# Patient Record
Sex: FEMALE | Race: OTHER | Employment: STUDENT | URBAN - METROPOLITAN AREA
[De-identification: names, ages, dates, MRNs, and addresses within clinical notes are randomized per-mention and may not be internally consistent; named-entity substitution may affect disease eponyms.]

---
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
## 2020-11-06 ENCOUNTER — LAB REQUISITION (OUTPATIENT)
Age: 20
End: 2020-11-06
Payer: COMMERCIAL

## 2020-11-06 DIAGNOSIS — Z20.828 CONTACT WITH AND (SUSPECTED) EXPOSURE TO OTHER VIRAL COMMUNICABLE DISEASES: ICD-10-CM

## 2020-11-09 NOTE — PROGRESS NOTES
Results reviewed and noted to be negative. Appropriate [facility] personnel responsible for documenting and following-up with client notified of test results and need to communicate such results to the client.

## 2020-12-11 ENCOUNTER — HOSPITAL ENCOUNTER (OUTPATIENT)
Age: 20
Discharge: HOME | End: 2020-12-11
Attending: EMERGENCY MEDICINE
Payer: COMMERCIAL

## 2020-12-11 VITALS
SYSTOLIC BLOOD PRESSURE: 130 MMHG | HEIGHT: 61 IN | RESPIRATION RATE: 18 BRPM | DIASTOLIC BLOOD PRESSURE: 79 MMHG | TEMPERATURE: 99 F | WEIGHT: 100 LBS | BODY MASS INDEX: 18.88 KG/M2 | HEART RATE: 110 BPM

## 2020-12-11 DIAGNOSIS — B37.3 CANDIDA VAGINITIS: Primary | ICD-10-CM

## 2020-12-11 PROCEDURE — 99203 OFFICE O/P NEW LOW 30 MIN: CPT | Performed by: EMERGENCY MEDICINE

## 2020-12-11 RX ORDER — FLUCONAZOLE 150 MG/1
150 TABLET ORAL ONCE
Qty: 1 TABLET | Refills: 0 | Status: SHIPPED | OUTPATIENT
Start: 2020-12-11 | End: 2020-12-11

## 2020-12-11 RX ORDER — NORGESTREL-ETHINYL ESTRADIOL 0.3-0.03MG
1 TABLET ORAL DAILY
COMMUNITY
Start: 2020-11-11 | End: 2020-12-31

## 2020-12-11 NOTE — ED PROVIDER NOTES
Patient Seen in: [facility]      History   Patient presents with:  Rash Skin Problem    Stated Complaint: rash    HPI  Patient has had some itching and vaginal irritation that feels somewhat similar to yeast infection she has had in the past. Head: Normocephalic and atraumatic. Eyes:      Conjunctiva/sclera: Conjunctivae normal.   Cardiovascular:      Rate and Rhythm: Normal rate and regular rhythm. Heart sounds: Normal heart sounds.    Pulmonary:      Effort: Pulmonary effort is corina

## 2020-12-11 NOTE — ED INITIAL ASSESSMENT (HPI)
Pt complaining of vaginal rash after using new sanitary pads this month and last month. No itchiness. No discharge.

## 2020-12-31 ENCOUNTER — OFFICE VISIT (OUTPATIENT)
Dept: OBGYN CLINIC | Facility: CLINIC | Age: 20
End: 2020-12-31
Payer: COMMERCIAL

## 2020-12-31 VITALS
BODY MASS INDEX: 21 KG/M2 | WEIGHT: 110.81 LBS | HEART RATE: 96 BPM | SYSTOLIC BLOOD PRESSURE: 111 MMHG | DIASTOLIC BLOOD PRESSURE: 78 MMHG

## 2020-12-31 DIAGNOSIS — Z01.419 ENCOUNTER FOR GYNECOLOGICAL EXAMINATION: Primary | ICD-10-CM

## 2020-12-31 DIAGNOSIS — Z11.3 SCREEN FOR STD (SEXUALLY TRANSMITTED DISEASE): ICD-10-CM

## 2020-12-31 PROCEDURE — 3074F SYST BP LT 130 MM HG: CPT | Performed by: OBSTETRICS & GYNECOLOGY

## 2020-12-31 PROCEDURE — 99385 PREV VISIT NEW AGE 18-39: CPT | Performed by: OBSTETRICS & GYNECOLOGY

## 2020-12-31 PROCEDURE — 3078F DIAST BP <80 MM HG: CPT | Performed by: OBSTETRICS & GYNECOLOGY

## 2020-12-31 RX ORDER — NORGESTREL-ETHINYL ESTRADIOL 0.3-0.03MG
1 TABLET ORAL DAILY
Qty: 1 PACKAGE | Refills: 12 | Status: SHIPPED | OUTPATIENT
Start: 2020-12-31 | End: 2021-12-03

## 2020-12-31 NOTE — PROGRESS NOTES
Niraj Lewis is a 21year old female  Patient's last menstrual period was 2020. here for annual exam.       New pt. Doing well with generic Lo/ovral.    Wants STD screen.       OBSTETRICS HISTORY:  OB History     T0    L0    SAB0 Body mass index is 20.94 kg/m².     Constitutional: well developed, well nourished  Neck/Thyroid: thyroid symmetric, no nodules  Heart:  Regular rate and rhythm  Lungs:  Clear to asculation  Breast: normal without palpable masses, tenderness, asymmetry, ni

## 2021-01-05 LAB
C TRACH DNA SPEC QL NAA+PROBE: NEGATIVE
N GONORRHOEA DNA SPEC QL NAA+PROBE: NEGATIVE
T VAGINALIS RRNA SPEC QL NAA+PROBE: NEGATIVE

## 2021-10-10 ENCOUNTER — HOSPITAL ENCOUNTER (OUTPATIENT)
Age: 21
Discharge: HOME | End: 2021-10-10
Payer: COMMERCIAL

## 2021-10-10 VITALS
BODY MASS INDEX: 21.71 KG/M2 | TEMPERATURE: 98 F | SYSTOLIC BLOOD PRESSURE: 115 MMHG | HEIGHT: 61 IN | OXYGEN SATURATION: 100 % | DIASTOLIC BLOOD PRESSURE: 72 MMHG | RESPIRATION RATE: 20 BRPM | HEART RATE: 81 BPM | WEIGHT: 115 LBS

## 2021-10-10 DIAGNOSIS — R20.2 PARESTHESIAS: Primary | ICD-10-CM

## 2021-10-10 PROCEDURE — 99203 OFFICE O/P NEW LOW 30 MIN: CPT | Performed by: NURSE PRACTITIONER

## 2021-10-10 RX ORDER — CYCLOBENZAPRINE HCL 10 MG
10 TABLET ORAL 3 TIMES DAILY PRN
Qty: 20 TABLET | Refills: 0 | Status: SHIPPED | OUTPATIENT
Start: 2021-10-10 | End: 2021-10-17

## 2021-10-10 NOTE — ED PROVIDER NOTES
Patient Seen in: [facility]      History   Patient presents with:  Musculoskeletal Problem    Stated Complaint: both legs tinglling X 1 wk    Subjective:   Eliana Jimenes is a 70-year-old female presenting to the immediate care complaining of Pulse 81   Temp 97.8 °F (36.6 °C) (Temporal)   Resp 20   Ht 154.9 cm (5' 1\")   Wt 52.2 kg   LMP 10/06/2021   SpO2 100%   BMI 21.73 kg/m²         Physical Exam  Vitals and nursing note reviewed. Constitutional:       Appearance: Normal appearance.    HENT Transient paresthesia. Low suspicion for DVT. Sciatica is not fully excluded. Discharge home. RX Flexeril, Epsom salt baths. Follow-up with primary care for further management.      Patient is afebrile, nontoxic in appearance without signs of clinical deter

## 2021-12-03 ENCOUNTER — TELEPHONE (OUTPATIENT)
Dept: OBGYN CLINIC | Facility: CLINIC | Age: 21
End: 2021-12-03

## 2021-12-03 RX ORDER — NORGESTREL-ETHINYL ESTRADIOL 0.3-0.03MG
1 TABLET ORAL DAILY
Qty: 28 TABLET | Refills: 3 | Status: SHIPPED | OUTPATIENT
Start: 2021-12-03

## 2021-12-03 NOTE — TELEPHONE ENCOUNTER
Pt requesting refill of b/c. Due for annual,   Scheduled annual on 3/30/22. Please advise. norgestrel-ethinyl estradiol (CRYSELLE-28) 0.3-30 MG-MCG Oral Tab 1 Package 12 12/31/2020    Sig:   Take 1 tablet by mouth daily.      Route:   Oral     Order

## 2022-03-22 RX ORDER — NORGESTREL-ETHINYL ESTRADIOL 0.3-0.03MG
1 TABLET ORAL DAILY
Qty: 28 TABLET | Refills: 3 | OUTPATIENT
Start: 2022-03-22

## 2022-03-22 NOTE — TELEPHONE ENCOUNTER
Pt given OCP refill per protocol on 12/2021 for 28 tablets and 3 refills to cover until annual on 3/30/2022 with JOSEK.

## 2022-03-30 ENCOUNTER — OFFICE VISIT (OUTPATIENT)
Dept: OBGYN CLINIC | Facility: CLINIC | Age: 22
End: 2022-03-30
Payer: COMMERCIAL

## 2022-03-30 VITALS
HEART RATE: 105 BPM | DIASTOLIC BLOOD PRESSURE: 79 MMHG | BODY MASS INDEX: 23 KG/M2 | SYSTOLIC BLOOD PRESSURE: 112 MMHG | WEIGHT: 121.19 LBS

## 2022-03-30 DIAGNOSIS — Z12.4 SCREENING FOR MALIGNANT NEOPLASM OF CERVIX: ICD-10-CM

## 2022-03-30 DIAGNOSIS — Z01.419 ENCOUNTER FOR GYNECOLOGICAL EXAMINATION: Primary | ICD-10-CM

## 2022-03-30 DIAGNOSIS — Z11.3 SCREEN FOR STD (SEXUALLY TRANSMITTED DISEASE): ICD-10-CM

## 2022-03-30 PROCEDURE — 3078F DIAST BP <80 MM HG: CPT | Performed by: OBSTETRICS & GYNECOLOGY

## 2022-03-30 PROCEDURE — 99395 PREV VISIT EST AGE 18-39: CPT | Performed by: OBSTETRICS & GYNECOLOGY

## 2022-03-30 PROCEDURE — 3074F SYST BP LT 130 MM HG: CPT | Performed by: OBSTETRICS & GYNECOLOGY

## 2022-03-30 RX ORDER — NORGESTREL-ETHINYL ESTRADIOL 0.3-0.03MG
1 TABLET ORAL DAILY
Qty: 28 TABLET | Refills: 12 | Status: SHIPPED | OUTPATIENT
Start: 2022-03-30

## 2022-03-31 LAB
C TRACH DNA SPEC QL NAA+PROBE: NEGATIVE
N GONORRHOEA DNA SPEC QL NAA+PROBE: NEGATIVE

## 2022-04-01 LAB — T VAGINALIS RRNA SPEC QL NAA+PROBE: NEGATIVE

## 2022-07-08 RX ORDER — NORGESTREL-ETHINYL ESTRADIOL 0.3-0.03MG
1 TABLET ORAL DAILY
Qty: 28 TABLET | Refills: 12 | OUTPATIENT
Start: 2022-07-08

## 2022-12-20 ENCOUNTER — HOSPITAL ENCOUNTER (EMERGENCY)
Facility: HOSPITAL | Age: 22
Discharge: HOME | End: 2022-12-20
Attending: EMERGENCY MEDICINE
Payer: COMMERCIAL

## 2022-12-20 VITALS
RESPIRATION RATE: 17 BRPM | TEMPERATURE: 98 F | OXYGEN SATURATION: 100 % | HEART RATE: 102 BPM | DIASTOLIC BLOOD PRESSURE: 61 MMHG | SYSTOLIC BLOOD PRESSURE: 100 MMHG

## 2022-12-20 DIAGNOSIS — R11.2 NAUSEA AND VOMITING IN ADULT: Primary | ICD-10-CM

## 2022-12-20 LAB
ANION GAP SERPL CALC-SCNC: 6 MMOL/L (ref 0–18)
BASOPHILS # BLD AUTO: 0.03 X10(3) UL (ref 0–0.2)
BASOPHILS NFR BLD AUTO: 0.2 %
BUN BLD-MCNC: 13 MG/DL (ref 7–18)
BUN/CREAT SERPL: 14.6 (ref 10–20)
CALCIUM BLD-MCNC: 9.1 MG/DL (ref 8.5–10.1)
CHLORIDE SERPL-SCNC: 107 MMOL/L (ref 98–112)
CO2 SERPL-SCNC: 26 MMOL/L (ref 21–32)
CREAT BLD-MCNC: 0.89 MG/DL
DEPRECATED RDW RBC AUTO: 38.5 FL (ref 35.1–46.3)
EOSINOPHIL # BLD AUTO: 0 X10(3) UL (ref 0–0.7)
EOSINOPHIL NFR BLD AUTO: 0 %
ERYTHROCYTE [DISTWIDTH] IN BLOOD BY AUTOMATED COUNT: 11.4 % (ref 11–15)
GFR SERPLBLD BASED ON 1.73 SQ M-ARVRAT: 94 ML/MIN/1.73M2 (ref 60–?)
GLUCOSE BLD-MCNC: 127 MG/DL (ref 70–99)
HCT VFR BLD AUTO: 42 %
HGB BLD-MCNC: 14.5 G/DL
IMM GRANULOCYTES # BLD AUTO: 0.06 X10(3) UL (ref 0–1)
IMM GRANULOCYTES NFR BLD: 0.4 %
LYMPHOCYTES # BLD AUTO: 1.08 X10(3) UL (ref 1–4)
LYMPHOCYTES NFR BLD AUTO: 6.6 %
MCH RBC QN AUTO: 31.7 PG (ref 26–34)
MCHC RBC AUTO-ENTMCNC: 34.5 G/DL (ref 31–37)
MCV RBC AUTO: 91.9 FL
MONOCYTES # BLD AUTO: 0.83 X10(3) UL (ref 0.1–1)
MONOCYTES NFR BLD AUTO: 5.1 %
NEUTROPHILS # BLD AUTO: 14.41 X10 (3) UL (ref 1.5–7.7)
NEUTROPHILS # BLD AUTO: 14.41 X10(3) UL (ref 1.5–7.7)
NEUTROPHILS NFR BLD AUTO: 87.7 %
OSMOLALITY SERPL CALC.SUM OF ELEC: 290 MOSM/KG (ref 275–295)
PLATELET # BLD AUTO: 389 10(3)UL (ref 150–450)
POTASSIUM SERPL-SCNC: 3.9 MMOL/L (ref 3.5–5.1)
RBC # BLD AUTO: 4.57 X10(6)UL
SODIUM SERPL-SCNC: 139 MMOL/L (ref 136–145)
WBC # BLD AUTO: 16.4 X10(3) UL (ref 4–11)

## 2022-12-20 PROCEDURE — 80048 BASIC METABOLIC PNL TOTAL CA: CPT | Performed by: EMERGENCY MEDICINE

## 2022-12-20 PROCEDURE — 99284 EMERGENCY DEPT VISIT MOD MDM: CPT

## 2022-12-20 PROCEDURE — 96361 HYDRATE IV INFUSION ADD-ON: CPT

## 2022-12-20 PROCEDURE — 96374 THER/PROPH/DIAG INJ IV PUSH: CPT

## 2022-12-20 PROCEDURE — 85025 COMPLETE CBC W/AUTO DIFF WBC: CPT | Performed by: EMERGENCY MEDICINE

## 2022-12-20 RX ORDER — ONDANSETRON 2 MG/ML
4 INJECTION INTRAMUSCULAR; INTRAVENOUS ONCE
Status: COMPLETED | OUTPATIENT
Start: 2022-12-20 | End: 2022-12-20

## 2022-12-20 NOTE — ED INITIAL ASSESSMENT (HPI)
Pt to ED for abdominal pain, nausea and vomiting for the past day. Pt states she took her birth control pill late without eating and symptoms started shortly after.

## 2023-01-15 ENCOUNTER — PATIENT MESSAGE (OUTPATIENT)
Dept: OBGYN CLINIC | Facility: CLINIC | Age: 23
End: 2023-01-15

## 2023-01-16 RX ORDER — NORGESTREL-ETHINYL ESTRADIOL 0.3-0.03MG
1 TABLET ORAL DAILY
Qty: 28 TABLET | Refills: 4 | Status: SHIPPED | OUTPATIENT
Start: 2023-01-16

## 2023-01-16 NOTE — TELEPHONE ENCOUNTER
From: Gabriella Jones  To: Alicia Osborne MD  Sent: 1/15/2023 9:18 PM CST  Subject: Birth control refill     Hello Dr Sawyer Espinoza,    I have scheduled my annual physical in the [facility]. Unfortunately I only have 2 more refills for my birth control. Would you be able to prescribe more to get me till June 28? Thank you!     Ruth López

## 2023-01-16 NOTE — TELEPHONE ENCOUNTER
Last annual - 3/30/2022  Last pap -3/30/2022, normal    Next annual - 6/28/2023    Pt states she has 2 refills and will run out before her annual exam.  If refills are sent now it will discontinue the 2 remaining refills pt has left.   Refill sent for 5 months to replace the 2 months she has and an additional 3 months to cover until annual.

## 2023-04-09 ENCOUNTER — HOSPITAL ENCOUNTER (OUTPATIENT)
Age: 23
Discharge: HOME | End: 2023-04-09
Payer: COMMERCIAL

## 2023-04-09 VITALS
OXYGEN SATURATION: 100 % | SYSTOLIC BLOOD PRESSURE: 132 MMHG | DIASTOLIC BLOOD PRESSURE: 84 MMHG | RESPIRATION RATE: 16 BRPM | HEART RATE: 107 BPM | TEMPERATURE: 98 F

## 2023-04-09 DIAGNOSIS — A08.4 VIRAL GASTROENTERITIS: Primary | ICD-10-CM

## 2023-04-09 PROCEDURE — 99213 OFFICE O/P EST LOW 20 MIN: CPT | Performed by: NURSE PRACTITIONER

## 2023-04-09 RX ORDER — ONDANSETRON 4 MG/1
4 TABLET, ORALLY DISINTEGRATING ORAL EVERY 4 HOURS PRN
Qty: 10 TABLET | Refills: 0 | Status: SHIPPED | OUTPATIENT
Start: 2023-04-09 | End: 2023-04-16

## 2023-04-09 NOTE — ED INITIAL ASSESSMENT (HPI)
Pt reports n/v/d beginning in the middle of the night. States 4x emesis, last 30 min PTA. No fevers, unable to tolerate PO.

## 2023-04-11 RX ORDER — NORGESTREL-ETHINYL ESTRADIOL 0.3-0.03MG
1 TABLET ORAL DAILY
Qty: 84 TABLET | Refills: 0 | Status: SHIPPED | OUTPATIENT
Start: 2023-04-11

## 2023-04-28 ENCOUNTER — OFFICE VISIT (OUTPATIENT)
Dept: OBGYN CLINIC | Facility: CLINIC | Age: 23
End: 2023-04-28

## 2023-04-28 VITALS
WEIGHT: 132 LBS | HEIGHT: 61 IN | SYSTOLIC BLOOD PRESSURE: 116 MMHG | HEART RATE: 92 BPM | DIASTOLIC BLOOD PRESSURE: 78 MMHG | BODY MASS INDEX: 24.92 KG/M2

## 2023-04-28 DIAGNOSIS — Z01.419 ENCOUNTER FOR GYNECOLOGICAL EXAMINATION: Primary | ICD-10-CM

## 2023-04-28 PROCEDURE — 3078F DIAST BP <80 MM HG: CPT | Performed by: OBSTETRICS & GYNECOLOGY

## 2023-04-28 PROCEDURE — 3074F SYST BP LT 130 MM HG: CPT | Performed by: OBSTETRICS & GYNECOLOGY

## 2023-04-28 PROCEDURE — 3008F BODY MASS INDEX DOCD: CPT | Performed by: OBSTETRICS & GYNECOLOGY

## 2023-04-28 PROCEDURE — 99395 PREV VISIT EST AGE 18-39: CPT | Performed by: OBSTETRICS & GYNECOLOGY

## 2023-04-28 RX ORDER — NORGESTREL-ETHINYL ESTRADIOL 0.3-0.03MG
1 TABLET ORAL DAILY
Qty: 84 TABLET | Refills: 3 | Status: SHIPPED | OUTPATIENT
Start: 2023-04-28

## 2023-08-03 ENCOUNTER — PATIENT MESSAGE (OUTPATIENT)
Dept: OBGYN CLINIC | Facility: CLINIC | Age: 23
End: 2023-08-03

## 2023-08-04 NOTE — TELEPHONE ENCOUNTER
From: Rona Fernández  To: Mary Finney MD  Sent: 8/3/2023 11:16 PM CDT  Subject: Bump on private area    [address] ,  I have clear bump on the outside of my vagina and I am not sure what it is. I am not sure if it a pimple or cyst but your opinion would give me piece of mind. It does not hurt or itch, but I would like get in so you can take a look at it. If you can let me know the earliest you can get me in. Thank you!

## 2024-02-13 ENCOUNTER — PATIENT MESSAGE (OUTPATIENT)
Dept: OBGYN CLINIC | Facility: CLINIC | Age: 24
End: 2024-02-13

## 2024-02-14 NOTE — TELEPHONE ENCOUNTER
From: Radha Calle  To: Lena Bradshaw  Sent: 2/13/2024 11:49 PM CST  Subject: Positive pregnancy test    Hello,    I received a positive pregnancy test and would like to schedule an appointment to confirm. This is my first pregnancy so not sure what the process is or how to start.    Thanks!  Radha

## 2024-02-28 ENCOUNTER — OFFICE VISIT (OUTPATIENT)
Dept: OBGYN CLINIC | Facility: CLINIC | Age: 24
End: 2024-02-28
Payer: COMMERCIAL

## 2024-02-28 ENCOUNTER — LAB ENCOUNTER (OUTPATIENT)
Dept: LAB | Facility: HOSPITAL | Age: 24
End: 2024-02-28
Attending: NURSE PRACTITIONER
Payer: COMMERCIAL

## 2024-02-28 VITALS
DIASTOLIC BLOOD PRESSURE: 82 MMHG | WEIGHT: 146.63 LBS | HEART RATE: 106 BPM | SYSTOLIC BLOOD PRESSURE: 127 MMHG | BODY MASS INDEX: 28 KG/M2

## 2024-02-28 DIAGNOSIS — Z32.00 PREGNANCY EXAMINATION OR TEST, PREGNANCY UNCONFIRMED: ICD-10-CM

## 2024-02-28 DIAGNOSIS — O26.859 SPOTTING IN PREGNANCY (HCC): Primary | ICD-10-CM

## 2024-02-28 DIAGNOSIS — O26.859 SPOTTING IN PREGNANCY (HCC): ICD-10-CM

## 2024-02-28 LAB
ANTIBODY SCREEN: NEGATIVE
B-HCG SERPL-ACNC: 192.5 MIU/ML
CONTROL LINE PRESENT WITH A CLEAR BACKGROUND (YES/NO): YES YES/NO
KIT LOT #: NORMAL NUMERIC
PREGNANCY TEST, URINE: POSITIVE
RH BLOOD TYPE: POSITIVE

## 2024-02-28 PROCEDURE — 3074F SYST BP LT 130 MM HG: CPT | Performed by: NURSE PRACTITIONER

## 2024-02-28 PROCEDURE — 86901 BLOOD TYPING SEROLOGIC RH(D): CPT

## 2024-02-28 PROCEDURE — 36415 COLL VENOUS BLD VENIPUNCTURE: CPT

## 2024-02-28 PROCEDURE — 3079F DIAST BP 80-89 MM HG: CPT | Performed by: NURSE PRACTITIONER

## 2024-02-28 PROCEDURE — 86900 BLOOD TYPING SEROLOGIC ABO: CPT

## 2024-02-28 PROCEDURE — 84702 CHORIONIC GONADOTROPIN TEST: CPT

## 2024-02-28 PROCEDURE — 86850 RBC ANTIBODY SCREEN: CPT

## 2024-02-28 PROCEDURE — 81025 URINE PREGNANCY TEST: CPT | Performed by: NURSE PRACTITIONER

## 2024-02-28 PROCEDURE — 99213 OFFICE O/P EST LOW 20 MIN: CPT | Performed by: NURSE PRACTITIONER

## 2024-02-28 NOTE — PROGRESS NOTES
[facility]   Obstetrics and Gynecology    Radha Calle is a 23 year old female  Patient's last menstrual period was 2024 (exact date).   Chief Complaint   Patient presents with    Gyn Problem     Pt report positive UPT light pink/red spotting onset  1 day. Pt reports cramping since  positive UPT   .   +home pregnancy  on . Stopped ocps in 2023    She reports yesterday noticed spotting with wiping after using restroom.    No cramping or pelvic pain.  No recent intercourse. No vomiting, slight nausea. Light pink discharge with wiping.    UPT in office faint positive line.    She would be 6w4d.  Pap:3/2022 NILM  Contraception: cryselle ocp    OBSTETRICS HISTORY:  OB History    Para Term  AB Living   0 0 0 0 0 0   SAB IAB Ectopic Multiple Live Births   0 0 0 0 0       GYNE HISTORY:  Menarche: 12-13 (2023  3:23 PM)  Period Cycle (Days): monthly (2023  3:23 PM)  Period Duration (Days): 5-6 (2023  3:23 PM)  Period Flow: moderate (2023  3:23 PM)  Use of Birth Control (if yes, specify type): OCP (2023  3:23 PM)  Pap Date: 22 (2023  3:23 PM)  Pap Result Notes: NEG PAP (2023  3:23 PM)      History   Sexual Activity    Sexual activity: Not on file       MEDICAL HISTORY:  No past medical history on file.    SOCIAL HISTORY:  Social History     Socioeconomic History    Marital status: Single     Spouse name: Not on file    Number of children: Not on file    Years of education: Not on file    Highest education level: Not on file   Occupational History    Not on file   Tobacco Use    Smoking status: Never     Passive exposure: Yes    Smokeless tobacco: Never   Substance and Sexual Activity    Alcohol use: Yes     Comment: occ    Drug use: Never    Sexual activity: Not on file   Other Topics Concern    Not on file   Social History Narrative    Not on file     Social Determinants of Health     Financial Resource Strain: Not on file   Food Insecurity: Not  on file   Transportation Needs: Not on file   Physical Activity: Not on file   Stress: Not on file   Social Connections: Not on file   Housing Stability: Not on file       MEDICATIONS:  No current outpatient medications on file.    ALLERGIES:  No Known Allergies      Review of Systems:  Constitutional:  Denies fatigue, night sweats, hot flashes  Cardiovascular:  denies chest pain or palpitations  Respiratory:  denies shortness of breath  Gastrointestinal:  denies heartburn, abdominal pain, diarrhea or constipation  Genitourinary:  denies dysuria, incontinence, abnormal vaginal discharge, vaginal itching see HPI  Musculoskeletal:  denies back pain.  Skin/Breast:  Denies any breast pain, lumps, or discharge.   Neurological:  denies headaches, extremity weakness or numbness.  Psychiatric: denies depression or anxiety.  Endocrine:   denies excessive thirst or urination.  Heme/Lymph:  denies history of anemia, easy bruising or bleeding.      PHYSICAL EXAM:     Vitals:    02/28/24 1533   BP: 127/82   Pulse: 106   Weight: 146 lb 9.6 oz (66.5 kg)     Body mass index is 27.7 kg/m².     Constitutional: well developed, well nourished    Psychiatric:  Oriented to time, place, person and situation. Appropriate mood and affect    Pelvic Exam:  External Genitalia: normal appearance, hair distribution, and no lesions  Urethral Meatus:  normal in size, location, without lesions and prolapse  Bladder:  No fullness, masses or tenderness  Vagina:  Normal appearance without lesions, scanty tan pink discharge at cervical os  Cervix:  closed,Normal without tenderness on motion  Uterus: normal in size, contour, position, mobility, without tenderness  Adnexa: normal without masses or tenderness  Perineum: normal  Anus: no hemorroids   Lymph node: no inguinal lymph nodes    Assessment & Plan:  Radha was seen today for gyn problem.    Diagnoses and all orders for this visit:    Spotting in pregnancy (HCC)  -     HCG, Beta Subunit (Quant  Pregnancy Test); Future  -     Patient Blood Type (ABORh); Future  -     Antibody Screen; Future  -     Chlamydia/Gc Amplification; Future  -     Trichomonas vaginalis, ETELVINA (Vaginal/Cervical); Future      Hcg today and blood type  No intercourse  Sab and ectopic precautions reviewed  Will order US pending HCG result  Cultures done    BARRON Quintana    This note was prepared using Dragon Medical voice recognition dictation software. As a result errors may occur. When identified these errors have been corrected. While every attempt is made to correct errors during dictation discrepancies may still exist.

## 2024-02-29 ENCOUNTER — PATIENT MESSAGE (OUTPATIENT)
Dept: OBGYN CLINIC | Facility: CLINIC | Age: 24
End: 2024-02-29

## 2024-02-29 ENCOUNTER — TELEPHONE (OUTPATIENT)
Dept: OBGYN CLINIC | Facility: CLINIC | Age: 24
End: 2024-02-29

## 2024-02-29 DIAGNOSIS — O26.859 SPOTTING IN PREGNANCY (HCC): Primary | ICD-10-CM

## 2024-02-29 LAB
C TRACH DNA SPEC QL NAA+PROBE: NEGATIVE
N GONORRHOEA DNA SPEC QL NAA+PROBE: NEGATIVE

## 2024-02-29 NOTE — TELEPHONE ENCOUNTER
Please call patient. HCG is 192 which is less than expected by LMP. See how bleeding is. Repeat hcg tomorrow. SAB and ectopic precautions.

## 2024-02-29 NOTE — TELEPHONE ENCOUNTER
Pt was seen yesterday by EMB for spotting in early pregnancy.  EMB ordered a quant with US to be ordered based on those results.  LMP was 1/13 and pt should be about 6w5d.      Quant yesterday was 192.5    Pt states this morning her bleeding was a little heavier and slightly more red when she urinated after waking.  Pt states not the spotting is lighter again, but still there.  Pt did have an internal exam at her visit yesterday.  Pt denies recent intercourse and denies constipation.  Pt asked if the increase in bleeding could be due to the exam yesterday.  Pt informed it is possible,.  Pt informed message will be sent to on call provider for recs.        Maternal blood type is O+.    Message also to EMB since pt did see EMB and EMB placed order for quant.

## 2024-02-29 NOTE — TELEPHONE ENCOUNTER
From: Radha Calle  To: Lelia Baez  Sent: 2/29/2024 6:21 AM CST  Subject: More bleeding    Hi ,    I just wanted to follow up and let you know I am bleeding the same color but more heavier. I wanted to follow up and see if you were able to see the blood tests to get an ultrasounds asap if possible to make sure the baby is ok. I am super worried now    Thank you,  Radha

## 2024-02-29 NOTE — TELEPHONE ENCOUNTER
Reviewed results and mcm from today with EMB in office.  Pt informed of results.  EMB states level is lower than we would expect at 6 weeks.  Pt instructed to get a second quant done on 3/1/24.  Order placed.  Pt states the bleeding is still the same as this morning but it is now bright red.  Pt is having mild cramping, 4/10, but states it is not localized to one side.  Pt advised if bleeding becomes heavier, or if the pain increases or localizes to one side to go to the ER for evaluation.  Message to EMB as FYI.

## 2024-02-29 NOTE — TELEPHONE ENCOUNTER
Her Bhcg is low which is most likely indicative of possible spontaneous ab.  Please have her repeat the serum quant tomorrow to see if decreasing.  Is pt having any pelvic pain at this time

## 2024-03-01 ENCOUNTER — HOSPITAL ENCOUNTER (EMERGENCY)
Facility: HOSPITAL | Age: 24
Discharge: HOME | End: 2024-03-01
Attending: EMERGENCY MEDICINE
Payer: COMMERCIAL

## 2024-03-01 ENCOUNTER — APPOINTMENT (OUTPATIENT)
Dept: ULTRASOUND IMAGING | Facility: HOSPITAL | Age: 24
End: 2024-03-01
Attending: EMERGENCY MEDICINE
Payer: COMMERCIAL

## 2024-03-01 VITALS
DIASTOLIC BLOOD PRESSURE: 78 MMHG | SYSTOLIC BLOOD PRESSURE: 128 MMHG | WEIGHT: 145 LBS | RESPIRATION RATE: 20 BRPM | OXYGEN SATURATION: 98 % | TEMPERATURE: 97 F | BODY MASS INDEX: 27.38 KG/M2 | HEIGHT: 61 IN | HEART RATE: 95 BPM

## 2024-03-01 DIAGNOSIS — O46.90 VAGINAL BLEEDING IN PREGNANCY (HCC): Primary | ICD-10-CM

## 2024-03-01 LAB
ALBUMIN SERPL-MCNC: 4.2 G/DL (ref 3.2–4.8)
ALBUMIN/GLOB SERPL: 1.4 {RATIO} (ref 1–2)
ALP LIVER SERPL-CCNC: 91 U/L
ALT SERPL-CCNC: 22 U/L
ANION GAP SERPL CALC-SCNC: 6 MMOL/L (ref 0–18)
AST SERPL-CCNC: 25 U/L (ref ?–34)
B-HCG SERPL-ACNC: 229.3 MIU/ML
B-HCG UR QL: NEGATIVE
BASOPHILS # BLD AUTO: 0.04 X10(3) UL (ref 0–0.2)
BASOPHILS NFR BLD AUTO: 0.5 %
BILIRUB SERPL-MCNC: 0.4 MG/DL (ref 0.3–1.2)
BUN BLD-MCNC: 9 MG/DL (ref 9–23)
BUN/CREAT SERPL: 10.1 (ref 10–20)
CALCIUM BLD-MCNC: 9.2 MG/DL (ref 8.7–10.4)
CHLORIDE SERPL-SCNC: 106 MMOL/L (ref 98–112)
CO2 SERPL-SCNC: 28 MMOL/L (ref 21–32)
CREAT BLD-MCNC: 0.89 MG/DL
DEPRECATED RDW RBC AUTO: 39.2 FL (ref 35.1–46.3)
EGFRCR SERPLBLD CKD-EPI 2021: 93 ML/MIN/1.73M2 (ref 60–?)
EOSINOPHIL # BLD AUTO: 0.27 X10(3) UL (ref 0–0.7)
EOSINOPHIL NFR BLD AUTO: 3.4 %
ERYTHROCYTE [DISTWIDTH] IN BLOOD BY AUTOMATED COUNT: 12 % (ref 11–15)
GLOBULIN PLAS-MCNC: 3 G/DL (ref 2.8–4.4)
GLUCOSE BLD-MCNC: 93 MG/DL (ref 70–99)
HCT VFR BLD AUTO: 41.8 %
HGB BLD-MCNC: 14.7 G/DL
IMM GRANULOCYTES # BLD AUTO: 0.07 X10(3) UL (ref 0–1)
IMM GRANULOCYTES NFR BLD: 0.9 %
LYMPHOCYTES # BLD AUTO: 2.64 X10(3) UL (ref 1–4)
LYMPHOCYTES NFR BLD AUTO: 33.6 %
MCH RBC QN AUTO: 31.9 PG (ref 26–34)
MCHC RBC AUTO-ENTMCNC: 35.2 G/DL (ref 31–37)
MCV RBC AUTO: 90.7 FL
MONOCYTES # BLD AUTO: 0.56 X10(3) UL (ref 0.1–1)
MONOCYTES NFR BLD AUTO: 7.1 %
NEUTROPHILS # BLD AUTO: 4.27 X10 (3) UL (ref 1.5–7.7)
NEUTROPHILS # BLD AUTO: 4.27 X10(3) UL (ref 1.5–7.7)
NEUTROPHILS NFR BLD AUTO: 54.5 %
OSMOLALITY SERPL CALC.SUM OF ELEC: 288 MOSM/KG (ref 275–295)
PLATELET # BLD AUTO: 344 10(3)UL (ref 150–450)
POTASSIUM SERPL-SCNC: 4 MMOL/L (ref 3.5–5.1)
PROT SERPL-MCNC: 7.2 G/DL (ref 5.7–8.2)
RBC # BLD AUTO: 4.61 X10(6)UL
SODIUM SERPL-SCNC: 140 MMOL/L (ref 136–145)
T VAGINALIS RRNA SPEC QL NAA+PROBE: NEGATIVE
WBC # BLD AUTO: 7.9 X10(3) UL (ref 4–11)

## 2024-03-01 PROCEDURE — 81025 URINE PREGNANCY TEST: CPT

## 2024-03-01 PROCEDURE — 84702 CHORIONIC GONADOTROPIN TEST: CPT | Performed by: EMERGENCY MEDICINE

## 2024-03-01 PROCEDURE — 85025 COMPLETE CBC W/AUTO DIFF WBC: CPT | Performed by: EMERGENCY MEDICINE

## 2024-03-01 PROCEDURE — 36415 COLL VENOUS BLD VENIPUNCTURE: CPT

## 2024-03-01 PROCEDURE — 76817 TRANSVAGINAL US OBSTETRIC: CPT | Performed by: EMERGENCY MEDICINE

## 2024-03-01 PROCEDURE — 99284 EMERGENCY DEPT VISIT MOD MDM: CPT

## 2024-03-01 PROCEDURE — 80053 COMPREHEN METABOLIC PANEL: CPT | Performed by: EMERGENCY MEDICINE

## 2024-03-01 PROCEDURE — 99285 EMERGENCY DEPT VISIT HI MDM: CPT

## 2024-03-01 PROCEDURE — 76801 OB US < 14 WKS SINGLE FETUS: CPT | Performed by: EMERGENCY MEDICINE

## 2024-03-01 NOTE — TELEPHONE ENCOUNTER
Pt notified of recs to repeat bhcg 48-72 hrs from prior. She will repeat Sunday, and we will review Monday AM.    Reiterated ectopic precautions with pt and when to go to ER. Patient verbalized understanding.     Await quant

## 2024-03-01 NOTE — ED INITIAL ASSESSMENT (HPI)
Pt to the ed for eval-g  Was seen by OB Wednesday + preg   Reports vaginal bleeding x 2 days  Lmp 1/13/24

## 2024-03-01 NOTE — TELEPHONE ENCOUNTER
Pt would be 6w6d by  LMP, however pt has been spotting.   She was seen by EMB  for spotting with wiping.   UPT in office with faint line. Quant drawn-192.5.     Pt returned to ER this am for ongoing bleeding.   Repeat quant 229.3, however UPT negative.     Component      Latest Ref Rng 2024 3/1/2024   HCG QUANTITATIVE      <=4.2 mIU/mL 192.5 (H)  229.3 (H)      Pelvic US done in ER today:  CONCLUSION:   No intrauterine pregnancy is identified. Although this may represent an early pregnancy, an ectopic pregnancy or abnormal pregnancy and/or spontaneous  is not excluded. Serial beta hCGs are recommended with possible serial followup ultrasound per clinical discretion.    ER called to review with EMB, as she saw pt . EMB requesting status update from pt, then we will review with on-call.     Pt reports pink/red bleeding, only needing panty liner. Denies any increase in bleeding, but persisting. Denies cramping or pain. Denies clots.   Ectopic precautions reviewed.    To MARVIN on call to advise on next steps.

## 2024-03-01 NOTE — ED QUICK NOTES
Pt is , 6w7d gestation. States started having some mild bleeding and saw OBNATACHAN x 2 days ago. Had + preg test and labs drawn. State today bleeding became heavier. Denies pain.

## 2024-03-01 NOTE — ED PROVIDER NOTES
Patient Seen in: [facility] Emergency Department    History     Chief Complaint   Patient presents with    Eval-G       HPI    History is provided by patient/independent historian: Patient  23 year old female with  at 7 weeks gestation here with complaints of increased vaginal bleeding for the past 3 days.  Patient saw the nurse practitioner when it initially started and had blood work done.  She was supposed to have repeat blood draw done today, but could not wait.  Patient states that she could fill of any liner with blood in half of the day.  She did not pass any clots or tissue.  No significant abdominal pain, no dizziness or weakness.  No urinary symptoms.    History reviewed. History reviewed. No pertinent past medical history.      History reviewed.   Past Surgical History:   Procedure Laterality Date    APPENDECTOMY           Home Medications reviewed :  (Not in a hospital admission)        History reviewed.   Social History     Socioeconomic History    Marital status: Single   Tobacco Use    Smoking status: Never     Passive exposure: Yes    Smokeless tobacco: Never   Substance and Sexual Activity    Alcohol use: Yes     Comment: occ    Drug use: Never         ROS  Review of Systems   Respiratory:  Negative for shortness of breath.    Cardiovascular:  Negative for chest pain.   Genitourinary:  Positive for vaginal bleeding.   All other systems reviewed and are negative.     All other pertinent organ systems are reviewed and are negative.      Physical Exam     ED Triage Vitals [24 0710]   /83   Pulse 96   Resp 18   Temp 97 °F (36.1 °C)   Temp src    SpO2 97 %   O2 Device None (Room air)     Vital signs reviewed.      Physical Exam  Vitals and nursing note reviewed.   Cardiovascular:      Pulses: Normal pulses.   Pulmonary:      Effort: No respiratory distress.   Abdominal:      General: There is no distension.      Tenderness: There is no abdominal tenderness.   Neurological:       Mental Status: She is alert.         ED Course       Labs:     Labs Reviewed   HCG, BETA SUBUNIT (QUANT PREGNANCY TEST) - Abnormal; Notable for the following components:       Result Value    Hcg Quantitative 229.3 (*)     All other components within normal limits   COMP METABOLIC PANEL (14) - Normal   POCT PREGNANCY URINE - Normal   CBC WITH DIFFERENTIAL WITH PLATELET    Narrative:     The following orders were created for panel order CBC With Differential With Platelet.                  Procedure                               Abnormality         Status                                     ---------                               -----------         ------                                     CBC W/ DIFFERENTIAL[[id number]]                              Final result                                                 Please view results for these tests on the individual orders.   RAINBOW DRAW LAVENDER   RAINBOW DRAW LIGHT GREEN   RAINBOW DRAW BLUE   CBC W/ DIFFERENTIAL         My EKG Interpretation:   As reviewed and Interpreted by me      Imaging Results Available and Reviewed while in ED:   US PREG 1ST TRIM W/EV (CPT=76801/34258)    Result Date: 3/1/2024  CONCLUSION:   No intrauterine pregnancy is identified. Although this may represent an early pregnancy, an ectopic pregnancy or abnormal pregnancy and/or spontaneous  is not excluded. Serial beta hCGs are recommended with possible serial followup ultrasound per  clinical discretion.       Dictated by (CST): Jhonathan Calderon MD on 3/01/2024 at 9:32 AM     Finalized by (CST): Jhonathan Calderon MD on 3/01/2024 at 9:33 AM         My review and independent interpretation of US images: no IUP. Radiology report corroborates this in addition to other details as reported by them.      Decision rules/scores evaluated: none      Diagnostic labs/tests considered but not ordered: ABO RH, pelvic US    ED Medications Administered: Medications - No data to display         - HCG not  trending appropriately - no evidence of ectopic on US - pt to f/u with ob    MDM       Medical Decision Making      Differential Diagnosis: After obtaining the patient's history, performing the physical exam and reviewing the diagnostics, multiple initial diagnoses were considered based on the presenting problem including ectopic pregnancy, spontaneous , incomplete miscarriage    External document review: I personally reviewed available external medical records for any recent pertinent discharge summaries, testing, and procedures - the findings are as follows: 24 visit with BARRON Baez for vaginal bleeding in pregnancy - .5    Complicating Factors: The patient already  has no past medical history on file. to contribute to the complexity of this ED evaluation.    Procedures performed: none    Discussed management with physician/appropriate source: none    Considered admission/deescalation of care for: none    Social determinants of health affecting patient care: none    Prescription medications considered: discussed continuing current medication regimen    The patient requires continuous monitoring for: vaginal bleeding in pregnancy    Shared decision making: discussed possible admission      Disposition and Plan     Clinical Impression:  1. Vaginal bleeding in pregnancy (HCC)        Disposition:  Discharge    Follow-up:  Lelia Baez APRN  [address] [address]  [phone]    Follow up        Medications Prescribed:  There are no discharge medications for this patient.

## 2024-03-03 ENCOUNTER — LAB ENCOUNTER (OUTPATIENT)
Dept: LAB | Facility: HOSPITAL | Age: 24
End: 2024-03-03
Attending: NURSE PRACTITIONER
Payer: COMMERCIAL

## 2024-03-03 DIAGNOSIS — O26.859 SPOTTING IN PREGNANCY (HCC): ICD-10-CM

## 2024-03-03 LAB — B-HCG SERPL-ACNC: 293.9 MIU/ML

## 2024-03-03 PROCEDURE — 84702 CHORIONIC GONADOTROPIN TEST: CPT

## 2024-03-03 PROCEDURE — 36415 COLL VENOUS BLD VENIPUNCTURE: CPT

## 2024-03-04 ENCOUNTER — TELEPHONE (OUTPATIENT)
Dept: OBGYN CLINIC | Facility: CLINIC | Age: 24
End: 2024-03-04

## 2024-03-04 VITALS — HEIGHT: 61 IN | WEIGHT: 145 LBS | BODY MASS INDEX: 27.38 KG/M2

## 2024-03-04 PROBLEM — O00.90 ECTOPIC PREGNANCY WITHOUT INTRAUTERINE PREGNANCY (HCC): Status: ACTIVE | Noted: 2024-03-04

## 2024-03-04 NOTE — TELEPHONE ENCOUNTER
Munira with [facility] calling back to schedule. Notified of below. They will await our call back if pt chooses to schedule.     See below.

## 2024-03-04 NOTE — TELEPHONE ENCOUNTER
Patient called in regarding message below.   Patient request for Lelia to call her to discuss Dr Saab message below.

## 2024-03-04 NOTE — TELEPHONE ENCOUNTER
LM with infusion center to call back.     In the meantime pt called with additional questions. Pt concerned about taking any action without additional testing. I did review poor increase on quants and neg US which does not correlate with 1/13 LMP.     Pt asking if we can order additional quant and repeat US before methotrexate.   Advised I will check with on-call. She is asking for review with EMB as well.   Ectopic precautions reviewed in detail.     To FERNANDA and EMB to advise.

## 2024-03-04 NOTE — TELEPHONE ENCOUNTER
Spoke with patient and discussed non-viable uterine pregnancy vs possible ectopic. US is negative and we recommend Methotrexate. We'll arrange today if possible. Message to RN pool. Need to try for today if possible at [facility]. Order set is done in an orders only encounter today.

## 2024-03-04 NOTE — TELEPHONE ENCOUNTER
I am walking into surgery now. She does not need further US with HCG this low. US purpose was to look for significant adnexal mass suspicious for larger ectopic. It was normal. Further HCG levels will not change recommendation for the Methotrexate as it has not risen appropriately. Happy to speak with her after OR but this may delay injection appt until tomorrow.

## 2024-03-04 NOTE — TELEPHONE ENCOUNTER
Pt informed of FERNANDA recs. Pt states she does not want to proceed with injection. We did discuss that low hcg levels does indicate she is pregnant but ultrasound indicates the pregnancy is not in the uterus. Pt informed since not in the uterus the pregnancy is most likely in one of her tubes. Pt states she would like to think about it. Wants FERNANDA or EMB to call her back to discuss further.     Pt informed that if she has any one sided pain or bleeding to head to ER. Pt informed that ruptured ectopic can lead to severe abdominal bleeding, which could lead to possible death. Pt states understanding.     To FERNANDA and EMB, please review and advise. Thank you.

## 2024-03-04 NOTE — TELEPHONE ENCOUNTER
Spoke with patient. Answered all questions. She agrees with management decision. Rns please call to schedule methotrexate.

## 2024-03-04 NOTE — TELEPHONE ENCOUNTER
Called [facility]. They cannot get pt in today due to end of day. They will reach out to pt now to schedule for tomorrow.     RN to follow-up and make sure pt is scheduled.

## 2024-03-05 ENCOUNTER — TELEPHONE (OUTPATIENT)
Dept: HEMATOLOGY/ONCOLOGY | Facility: HOSPITAL | Age: 24
End: 2024-03-05

## 2024-03-05 ENCOUNTER — APPOINTMENT (OUTPATIENT)
Dept: HEMATOLOGY/ONCOLOGY | Facility: HOSPITAL | Age: 24
End: 2024-03-05
Attending: OBSTETRICS & GYNECOLOGY
Payer: COMMERCIAL

## 2024-03-05 NOTE — TELEPHONE ENCOUNTER
This RN called and spoke with WON Patterson, at OB/GYN office to notify MD that patient called today to cancel her methotrexate injection.

## 2024-03-05 NOTE — TELEPHONE ENCOUNTER
Returned patient call from after hours message:per patient she sent a my chart message to doctor office stating she is wanting to wait and give my thought before going through with injection

## 2024-03-08 ENCOUNTER — LAB ENCOUNTER (OUTPATIENT)
Dept: LAB | Facility: HOSPITAL | Age: 24
End: 2024-03-08
Attending: OBSTETRICS & GYNECOLOGY
Payer: COMMERCIAL

## 2024-03-08 ENCOUNTER — OFFICE VISIT (OUTPATIENT)
Dept: OBGYN CLINIC | Facility: CLINIC | Age: 24
End: 2024-03-08
Payer: COMMERCIAL

## 2024-03-08 ENCOUNTER — PATIENT MESSAGE (OUTPATIENT)
Dept: OBGYN CLINIC | Facility: CLINIC | Age: 24
End: 2024-03-08

## 2024-03-08 VITALS
HEART RATE: 112 BPM | BODY MASS INDEX: 28 KG/M2 | SYSTOLIC BLOOD PRESSURE: 121 MMHG | DIASTOLIC BLOOD PRESSURE: 81 MMHG | WEIGHT: 147 LBS

## 2024-03-08 DIAGNOSIS — O20.0 THREATENED ABORTION, ANTEPARTUM (HCC): ICD-10-CM

## 2024-03-08 DIAGNOSIS — O20.0 THREATENED ABORTION, ANTEPARTUM (HCC): Primary | ICD-10-CM

## 2024-03-08 LAB — B-HCG SERPL-ACNC: 154 MIU/ML

## 2024-03-08 PROCEDURE — 3079F DIAST BP 80-89 MM HG: CPT | Performed by: OBSTETRICS & GYNECOLOGY

## 2024-03-08 PROCEDURE — 3074F SYST BP LT 130 MM HG: CPT | Performed by: OBSTETRICS & GYNECOLOGY

## 2024-03-08 PROCEDURE — 84702 CHORIONIC GONADOTROPIN TEST: CPT

## 2024-03-08 PROCEDURE — 36415 COLL VENOUS BLD VENIPUNCTURE: CPT

## 2024-03-08 PROCEDURE — 99213 OFFICE O/P EST LOW 20 MIN: CPT | Performed by: OBSTETRICS & GYNECOLOGY

## 2024-03-08 RX ORDER — CHOLECALCIFEROL (VITAMIN D3) 25 MCG
1 TABLET,CHEWABLE ORAL DAILY
COMMUNITY

## 2024-03-08 NOTE — TELEPHONE ENCOUNTER
From: Radha Calle  To: Lena Bradshaw  Sent: 3/8/2024 4:02 PM CST  Subject: Results    Hello,    So it looks like an active miscarriage. Is there anything I need to prepare or do? I know you want to see me to do blood work to confirm it keeps decreasing (hcg levels) thank you again.

## 2024-03-08 NOTE — TELEPHONE ENCOUNTER
Component      Latest Ref Rng 3/1/2024 3/3/2024 3/8/2024   HCG QUANTITATIVE      <=4.2 mIU/mL 229.3 (H)  293.9 (H)  154.0 (H)       Discussed with JOSEK- plan for hcg on Monday.   Pt notified.

## 2024-03-09 NOTE — PROGRESS NOTES
Radha Calle is a 23 year old female  Patient's last menstrual period was 2024 (exact date).   Chief Complaint   Patient presents with    Follow - Up     PN - Lightly spotting per pt      Last seen 2023.   Stopped ocp 2023.  Menses q month.  LMP 24.  EGA 8 wk    Seen EMB due to bleeding.  Bhcg --192  Bhcg 3/1--229  Bhcg 3/3--293    Went to ER on 3/1/24-- pelvic ultrasound-- no IUP.  No adnexal masses.    Was recommended methotrexate for abnormal rising bhcg but pt was not comfortable  She went to [facility] on 3/5/24.  Bhcg 303.  Ultrasound showed small sac.    States she had more bleeding on 3/6 and passed small clot.  No longer bleeding.  No pelvic pain.      OBSTETRICS HISTORY:  OB History    Para Term  AB Living   1 0 0 0 0 0   SAB IAB Ectopic Multiple Live Births   0 0 0 0 0       GYNE HISTORY   Pap Date: 22  Pap Result Notes: NEG PAP  Follow Up Recommendation: Annual 23 K    MEDICAL HISTORY:  No past medical history on file.  Past Surgical History:   Procedure Laterality Date    APPENDECTOMY         SOCIAL HISTORY:  Social History     Socioeconomic History    Marital status: Single   Tobacco Use    Smoking status: Never     Passive exposure: Yes    Smokeless tobacco: Never   Substance and Sexual Activity    Alcohol use: Yes     Comment: occ    Drug use: Never       MEDICATIONS:  Current Outpatient Medications   Medication Sig Dispense Refill    prenatal vitamin with DHA 27-0.8-228 MG Oral Cap Take 1 capsule by mouth daily.         ALLERGIES:  No Known Allergies      PHYSICAL EXAM:   /81   Pulse 112   Wt 147 lb (66.7 kg)   LMP 2024 (Exact Date)   BMI 27.78 kg/m²   Body mass index is 27.78 kg/m².    Constitutional: well developed, well nourished       Pelvic Exam:  External Genitalia: normal appearance, hair distribution, and no lesions  Urethral Meatus:  normal in size, location, without lesions and prolapse  Bladder:  No fullness,  masses or tenderness  Vagina:  Normal appearance without lesions, no abnormal discharge  Cervix:  Normal without tenderness on motion  Uterus: normal in size, contour, position, mobility, without tenderness  Adnexa: normal without masses or tenderness      Assessment & Plan:  1. Threatened , antepartum (HCC)  Bhcg today.  If significant decrease, then repeat bhcg on Monday.  If bhcg unchanged, then methotrexate.      Requested Prescriptions      No prescriptions requested or ordered in this encounter

## 2024-03-11 ENCOUNTER — LAB ENCOUNTER (OUTPATIENT)
Dept: LAB | Facility: HOSPITAL | Age: 24
End: 2024-03-11
Attending: OBSTETRICS & GYNECOLOGY
Payer: COMMERCIAL

## 2024-03-11 DIAGNOSIS — O26.859 SPOTTING IN PREGNANCY (HCC): ICD-10-CM

## 2024-03-11 LAB — B-HCG SERPL-ACNC: 121.8 MIU/ML

## 2024-03-11 PROCEDURE — 36415 COLL VENOUS BLD VENIPUNCTURE: CPT

## 2024-03-11 PROCEDURE — 84702 CHORIONIC GONADOTROPIN TEST: CPT

## 2024-03-12 NOTE — TELEPHONE ENCOUNTER
Component      Latest Ref Rng 3/1/2024 3/3/2024 3/8/2024 3/11/2024   HCG QUANTITATIVE      <=4.2 mIU/mL 229.3 (H)  293.9 (H)  154.0 (H)  121.8 (H)       To FERNANDA on call to please review quants and advise.

## 2024-03-14 ENCOUNTER — PATIENT MESSAGE (OUTPATIENT)
Dept: OBGYN CLINIC | Facility: CLINIC | Age: 24
End: 2024-03-14

## 2024-03-14 DIAGNOSIS — O03.9 MISCARRIAGE (HCC): Primary | ICD-10-CM

## 2024-03-15 NOTE — TELEPHONE ENCOUNTER
From: Radha Calle  To: Lena Bradshaw  Sent: 3/14/2024 11:51 PM CDT  Subject: Next test     Hi doctor Leeann,    When would you like me to go get another hcg test ? My last test was Monday.    Thank you!

## 2024-03-18 ENCOUNTER — LAB ENCOUNTER (OUTPATIENT)
Dept: LAB | Facility: HOSPITAL | Age: 24
End: 2024-03-18
Attending: OBSTETRICS & GYNECOLOGY
Payer: COMMERCIAL

## 2024-03-18 DIAGNOSIS — O03.9 MISCARRIAGE (HCC): ICD-10-CM

## 2024-03-18 LAB — B-HCG SERPL-ACNC: 26.4 MIU/ML

## 2024-03-18 PROCEDURE — 84702 CHORIONIC GONADOTROPIN TEST: CPT

## 2024-03-18 PROCEDURE — 36415 COLL VENOUS BLD VENIPUNCTURE: CPT

## 2024-03-19 ENCOUNTER — TELEPHONE (OUTPATIENT)
Dept: OBGYN CLINIC | Facility: CLINIC | Age: 24
End: 2024-03-19

## 2024-03-19 NOTE — TELEPHONE ENCOUNTER
----- Message from Lena Bradshaw MD sent at 3/18/2024  3:00 PM CDT -----  Bhcg down to 26.   Weekly bhcg til negative    Pt informed of JLK recs and verbalized understanding. Pt wondering when it is safe to try to conceive again. Advised pt that recommendation is to wait until after she has received her next spontaneous period before trying to conceive. Pt verbalized understanding.

## 2024-03-25 ENCOUNTER — LAB ENCOUNTER (OUTPATIENT)
Dept: LAB | Facility: HOSPITAL | Age: 24
End: 2024-03-25
Attending: OBSTETRICS & GYNECOLOGY
Payer: COMMERCIAL

## 2024-03-25 DIAGNOSIS — O03.9 MISCARRIAGE (HCC): ICD-10-CM

## 2024-03-25 LAB — B-HCG SERPL-ACNC: <2.6 MIU/ML

## 2024-03-25 PROCEDURE — 84702 CHORIONIC GONADOTROPIN TEST: CPT

## 2024-03-25 PROCEDURE — 36415 COLL VENOUS BLD VENIPUNCTURE: CPT

## 2024-03-27 NOTE — PROGRESS NOTES
Patient notified of neg [facility]. Nothing further to do.   Patient verbalized understanding.

## 2024-03-28 ENCOUNTER — TELEPHONE (OUTPATIENT)
Dept: OBGYN UNIT | Facility: HOSPITAL | Age: 24
End: 2024-03-28

## 2024-06-26 ENCOUNTER — PATIENT MESSAGE (OUTPATIENT)
Dept: OBGYN CLINIC | Facility: CLINIC | Age: 24
End: 2024-06-26

## 2024-06-26 DIAGNOSIS — Z32.00 PREGNANCY EXAMINATION OR TEST, PREGNANCY UNCONFIRMED: Primary | ICD-10-CM

## 2024-06-27 ENCOUNTER — LAB ENCOUNTER (OUTPATIENT)
Dept: LAB | Facility: HOSPITAL | Age: 24
End: 2024-06-27
Attending: OBSTETRICS & GYNECOLOGY
Payer: COMMERCIAL

## 2024-06-27 DIAGNOSIS — Z32.00 PREGNANCY EXAMINATION OR TEST, PREGNANCY UNCONFIRMED: ICD-10-CM

## 2024-06-27 LAB — HCG SERPL QL: NEGATIVE

## 2024-06-27 PROCEDURE — 84703 CHORIONIC GONADOTROPIN ASSAY: CPT

## 2024-06-27 PROCEDURE — 36415 COLL VENOUS BLD VENIPUNCTURE: CPT

## 2024-06-27 NOTE — TELEPHONE ENCOUNTER
From: Radha Calle  To: Lena Bradshaw  Sent: 6/26/2024 10:51 PM CDT  Subject: Missed period     Hi Dr Bradshaw,    I am currently 13 days late but have tested negative multiple times. I did recently go on vacation not sure if that could have thrown me off my cycle. After my miscarriage in late Feb early March I was pretty consistent with 28-31 day cycles. Is there any way you can schedule me to get some blood work done and we can see where my hcg levels are?

## 2024-12-01 ENCOUNTER — PATIENT MESSAGE (OUTPATIENT)
Dept: OBGYN CLINIC | Facility: CLINIC | Age: 24
End: 2024-12-01

## 2024-12-01 DIAGNOSIS — N92.6 MISSED PERIODS: Primary | ICD-10-CM

## 2024-12-02 NOTE — TELEPHONE ENCOUNTER
Message to SAVAGE. Pt stated she has not had a period since May and is asking for next steps. States she has taken pregnancy tests and they have been negative.

## 2024-12-03 NOTE — TELEPHONE ENCOUNTER
Get serum pregnancy test, TSH, prolactin.  If labs normal, then Provera 10 mg x 10 days to induce period

## 2024-12-04 ENCOUNTER — LAB ENCOUNTER (OUTPATIENT)
Dept: LAB | Facility: HOSPITAL | Age: 24
End: 2024-12-04
Attending: OBSTETRICS & GYNECOLOGY
Payer: COMMERCIAL

## 2024-12-04 DIAGNOSIS — N92.6 MISSED PERIODS: ICD-10-CM

## 2024-12-04 LAB
HCG SERPL QL: NEGATIVE
PROLACTIN SERPL-MCNC: 10.4 NG/ML
TSI SER-ACNC: 8.7 UIU/ML (ref 0.55–4.78)

## 2024-12-04 PROCEDURE — 84443 ASSAY THYROID STIM HORMONE: CPT

## 2024-12-04 PROCEDURE — 36415 COLL VENOUS BLD VENIPUNCTURE: CPT

## 2024-12-04 PROCEDURE — 84146 ASSAY OF PROLACTIN: CPT

## 2024-12-04 PROCEDURE — 84703 CHORIONIC GONADOTROPIN ASSAY: CPT

## 2024-12-05 ENCOUNTER — TELEPHONE (OUTPATIENT)
Dept: OBGYN CLINIC | Facility: CLINIC | Age: 24
End: 2024-12-05

## 2024-12-05 RX ORDER — MEDROXYPROGESTERONE ACETATE 10 MG
10 TABLET ORAL DAILY
Qty: 10 TABLET | Refills: 0 | Status: SHIPPED | OUTPATIENT
Start: 2024-12-05

## 2024-12-06 NOTE — TELEPHONE ENCOUNTER
----- Message from Lena Bradshaw sent at 12/5/2024 10:04 AM CST -----  Abnormal TSH.   Needs to see PCP.  Normal prolactin.  Negative pregnancy test.  Can take Provera to get period.  See previous comm

## 2025-01-27 ENCOUNTER — OFFICE VISIT (OUTPATIENT)
Dept: OBGYN CLINIC | Facility: CLINIC | Age: 25
End: 2025-01-27
Payer: COMMERCIAL

## 2025-01-27 VITALS
HEART RATE: 91 BPM | WEIGHT: 155.38 LBS | DIASTOLIC BLOOD PRESSURE: 79 MMHG | BODY MASS INDEX: 29 KG/M2 | SYSTOLIC BLOOD PRESSURE: 120 MMHG

## 2025-01-27 DIAGNOSIS — N92.6 IRREGULAR PERIODS: Primary | ICD-10-CM

## 2025-01-27 PROCEDURE — 99212 OFFICE O/P EST SF 10 MIN: CPT | Performed by: OBSTETRICS & GYNECOLOGY

## 2025-01-27 PROCEDURE — 3078F DIAST BP <80 MM HG: CPT | Performed by: OBSTETRICS & GYNECOLOGY

## 2025-01-27 PROCEDURE — 3074F SYST BP LT 130 MM HG: CPT | Performed by: OBSTETRICS & GYNECOLOGY

## 2025-01-27 RX ORDER — MEDROXYPROGESTERONE ACETATE 10 MG
10 TABLET ORAL DAILY
Qty: 10 TABLET | Refills: 5 | Status: SHIPPED | OUTPATIENT
Start: 2025-01-27

## 2025-01-28 NOTE — PROGRESS NOTES
Radha Calle is a 24 year old female  Patient's last menstrual period was 2024 (exact date).   Chief Complaint   Patient presents with    Gyn Problem     C/O IRREGULAR PERIODS last menstrual period IN DEC WITH negative HCG       Last seen 3/8/2024.    Had SAB.  Had negative bhcg in 2024.   Had period in 2024 and 2024.  No period from 2024--2024.    Pt took Provera x 10 days in 2024.  Had withdrawal bleeding on 12/23/24 x 7 days.    Had TSH on 2024--8.6.   normal prolactin.   Went to PCP elsewhere and repeated bloodwork and was told it was ok.  No meds given.    Actively trying to get pregnant.  Taking ovulation test that showed probable ovuliation        OBSTETRICS HISTORY:  OB History    Para Term  AB Living   1 0 0 0 0 0   SAB IAB Ectopic Multiple Live Births   0 0 0 0 0       GYNE HISTORY   Pap Date: 22  Pap Result Notes: NEG PAP  Follow Up Recommendation: Annual 23 K    MEDICAL HISTORY:  No past medical history on file.  Past Surgical History:   Procedure Laterality Date    Appendectomy         SOCIAL HISTORY:  Social History     Socioeconomic History    Marital status: Single   Tobacco Use    Smoking status: Never     Passive exposure: Yes    Smokeless tobacco: Never   Substance and Sexual Activity    Alcohol use: Yes     Comment: occ    Drug use: Never       MEDICATIONS:  Current Outpatient Medications   Medication Sig Dispense Refill    medroxyPROGESTERone Acetate (PROVERA) 10 MG Oral Tab Take 1 tablet (10 mg total) by mouth daily. 10 tablet 5    prenatal vitamin with DHA 27-0.8-228 MG Oral Cap Take 1 capsule by mouth daily.         ALLERGIES:  Allergies[1]      PHYSICAL EXAM:   /79   Pulse 91   Wt 155 lb 6.4 oz (70.5 kg)   LMP 2024 (Exact Date)   BMI 29.36 kg/m²   Body mass index is 29.36 kg/m².    Constitutional: well developed, well nourished        Assessment & Plan:  1. Irregular periods  Refill Provera 10 mg x 10 days q month to  get withdrawal bleeding.  Will have focused midcycle intercourse.  Will get copy of lab results.  Informed pt that TSH needs to be <3 during 1st trimester of pregnancy.            Requested Prescriptions     Signed Prescriptions Disp Refills    medroxyPROGESTERone Acetate (PROVERA) 10 MG Oral Tab 10 tablet 5     Sig: Take 1 tablet (10 mg total) by mouth daily.              [1] No Known Allergies

## 2025-05-30 ENCOUNTER — OFFICE VISIT (OUTPATIENT)
Dept: ENDOCRINOLOGY CLINIC | Facility: CLINIC | Age: 25
End: 2025-05-30
Payer: COMMERCIAL

## 2025-05-30 ENCOUNTER — LAB ENCOUNTER (OUTPATIENT)
Dept: LAB | Facility: HOSPITAL | Age: 25
End: 2025-05-30
Attending: INTERNAL MEDICINE
Payer: COMMERCIAL

## 2025-05-30 VITALS
SYSTOLIC BLOOD PRESSURE: 116 MMHG | DIASTOLIC BLOOD PRESSURE: 81 MMHG | BODY MASS INDEX: 29 KG/M2 | HEART RATE: 80 BPM | WEIGHT: 156 LBS

## 2025-05-30 DIAGNOSIS — N91.1 SECONDARY AMENORRHEA: ICD-10-CM

## 2025-05-30 DIAGNOSIS — E06.3 HYPOTHYROIDISM DUE TO HASHIMOTO'S THYROIDITIS: ICD-10-CM

## 2025-05-30 DIAGNOSIS — E06.3 HYPOTHYROIDISM DUE TO HASHIMOTO'S THYROIDITIS: Primary | ICD-10-CM

## 2025-05-30 LAB
CORTIS SERPL-MCNC: 6 UG/DL
DHEA-S SERPL-MCNC: 292.7 UG/DL (ref 25.9–460.2)
ESTRADIOL SERPL-MCNC: 62.9 PG/ML
FSH SERPL-ACNC: 7.1 MIU/ML
LH SERPL-ACNC: 15.4 MIU/ML
PROLACTIN SERPL-MCNC: 8.4 NG/ML
T4 FREE SERPL-MCNC: 1.4 NG/DL (ref 0.8–1.7)
THYROPEROXIDASE AB SERPL-ACNC: 1985 U/ML (ref ?–60)
TSI SER-ACNC: 2.11 UIU/ML (ref 0.55–4.78)

## 2025-05-30 PROCEDURE — 82670 ASSAY OF TOTAL ESTRADIOL: CPT

## 2025-05-30 PROCEDURE — 36415 COLL VENOUS BLD VENIPUNCTURE: CPT

## 2025-05-30 PROCEDURE — 84410 TESTOSTERONE BIOAVAILABLE: CPT

## 2025-05-30 PROCEDURE — 83002 ASSAY OF GONADOTROPIN (LH): CPT

## 2025-05-30 PROCEDURE — 84439 ASSAY OF FREE THYROXINE: CPT

## 2025-05-30 PROCEDURE — 82533 TOTAL CORTISOL: CPT

## 2025-05-30 PROCEDURE — 99204 OFFICE O/P NEW MOD 45 MIN: CPT | Performed by: INTERNAL MEDICINE

## 2025-05-30 PROCEDURE — 3074F SYST BP LT 130 MM HG: CPT | Performed by: INTERNAL MEDICINE

## 2025-05-30 PROCEDURE — 82627 DEHYDROEPIANDROSTERONE: CPT

## 2025-05-30 PROCEDURE — 84443 ASSAY THYROID STIM HORMONE: CPT

## 2025-05-30 PROCEDURE — 83001 ASSAY OF GONADOTROPIN (FSH): CPT

## 2025-05-30 PROCEDURE — 84146 ASSAY OF PROLACTIN: CPT

## 2025-05-30 PROCEDURE — 3079F DIAST BP 80-89 MM HG: CPT | Performed by: INTERNAL MEDICINE

## 2025-05-30 PROCEDURE — 86376 MICROSOMAL ANTIBODY EACH: CPT

## 2025-05-30 RX ORDER — LEVOTHYROXINE SODIUM 37.5 UG/1
CAPSULE ORAL
COMMUNITY
Start: 2025-01-30

## 2025-05-30 RX ORDER — LEVOTHYROXINE SODIUM 50 UG/1
50 TABLET ORAL
Qty: 90 TABLET | Refills: 1 | Status: SHIPPED | OUTPATIENT
Start: 2025-05-30

## 2025-05-30 NOTE — PROGRESS NOTES
The following individual(s) verbally consented to be recorded using ambient AI listening technology and understand that they can each withdraw their consent to this listening technology at any point by asking the clinician to turn off or pause the recording:    Patient name: Radha Calle      Subjective:   Radha Calle is a 24 year old female who presents for Consult (Pt in to discuss thyroid issues)       History/Other:   History of Present Illness  Radha Calle is a 24 year old female who presents with irregular menstrual cycles and suspected thyroid dysfunction. She was referred by Dr. Bradshaw for evaluation of thyroid dysfunction.    She has experienced irregular menstrual cycles since a miscarriage at the beginning of last year. Her periods were previously regular, but now she requires Provera intermittently to induce menstruation, which she prefers not to rely on.    Following the miscarriage, elevated thyroid levels were detected, leading to a referral for further evaluation. Initial thyroid function tests showed a TSH of 6.2, T4 of 0.8, and T3 of 3.9. Despite these findings, medication was not initially prescribed. She was later started on a low dose of thyroid medication, which was subsequently increased.    She is currently taking 37.5 micrograms of thyroid medication first thing in the morning on an empty stomach. Despite this, she continues to feel tired and her menstrual cycles have not regulated. She has been trying to conceive but finds it challenging without regular cycles.    There is no known family history of thyroid disease. She has been actively seeking follow-up lab results to monitor her thyroid levels but has faced difficulties obtaining them.    She feels frustrated with the lack of attention to her symptoms and the back-and-forth between different doctors without a clear resolution.   Chief Complaint Reviewed and Verified  Nursing Notes Reviewed and   Verified  Tobacco Reviewed   Medications Reviewed         Tobacco:  none    Current Medications[1]        Review of Systems:  Pertinent items are noted in HPI.      Objective:   /81   Pulse 80   Wt 156 lb (70.8 kg)   LMP 12/23/2024 (Exact Date)   BMI 29.48 kg/m²  Estimated body mass index is 29.48 kg/m² as calculated from the following:    Height as of 3/4/24: 5' 1\" (1.549 m).    Weight as of this encounter: 156 lb (70.8 kg).  Results  LABS  TSH: 6.2 (01/2025)  T4: 0.8 (01/2025)  T3: 3.9 (01/2025)     Physical Exam  NECK: Thyroid gland normal on palpation.  PHYSICAL EXAM  General Appearance:  alert, well developed, in no acute distress  Eyes:  normal conjunctivae, sclera., normal sclera and normal pupils  Throat/Neck: normal sound to voice.   Back: no kyphosis  Respiratory:  non-labored. no increased work of breathing.    Lymph Nodes:  No abnormal nodes noted  Skin:  normal moisture and skin texture  Hematologic:  no excessive bruising  Psychiatric:  oriented to time, self, and place      Assessment & Plan:   1. Hypothyroidism due to Hashimoto's thyroiditis (Primary)  -     Assay, Thyroid Stim Hormone; Future; Expected date: 05/30/2025  -     Free T4, (Free Thyroxine); Future; Expected date: 05/30/2025  -     Thyroid Peroxidase (TPO) AB; Future; Expected date: 05/30/2025  -     Testosterone,Total and Weakly Bound w/ SHBG; Future; Expected date: 05/30/2025  -     Prolactin; Future; Expected date: 05/30/2025  -     Cortisol; Future; Expected date: 05/30/2025  -     Estradiol; Future; Expected date: 05/30/2025  -     FSH; Future; Expected date: 05/30/2025  -     LH (Luteinizing Hormone); Future; Expected date: 05/30/2025  -     Dehydroepiandrosterone Sulfate; Future; Expected date: 05/30/2025  2. Secondary amenorrhea  -     Assay, Thyroid Stim Hormone; Future; Expected date: 05/30/2025  -     Free T4, (Free Thyroxine); Future; Expected date: 05/30/2025  -     Thyroid Peroxidase (TPO) AB; Future; Expected date: 05/30/2025  -      Testosterone,Total and Weakly Bound w/ SHBG; Future; Expected date: 05/30/2025  -     Prolactin; Future; Expected date: 05/30/2025  -     Cortisol; Future; Expected date: 05/30/2025  -     Estradiol; Future; Expected date: 05/30/2025  -     FSH; Future; Expected date: 05/30/2025  -     LH (Luteinizing Hormone); Future; Expected date: 05/30/2025  -     Dehydroepiandrosterone Sulfate; Future; Expected date: 05/30/2025  Other orders  -     Levothyroxine Sodium; Take 1 tablet (50 mcg total) by mouth before breakfast.  Dispense: 90 tablet; Refill: 1    Assessment & Plan  Hypothyroidism  Elevated TSH, low T4, normal T3. Current treatment insufficient. Suspected Hashimoto's thyroiditis. Current medication expensive and unnecessary.  - Increase levothyroxine to 50 mcg.  - Instruct to take medication on an empty stomach, 30 minutes before eating.  - Order thyroid antibody tests for Hashimoto's evaluation.  - Send prescription to [facility].    Irregular menstrual cycle  Irregular cycles persist despite thyroid treatment. Possible hormonal imbalance. No comprehensive hormone panel done.  - Order comprehensive hormone panel including testosterone and estrogen.  - Evaluate thyroid function with updated labs.  - Consider infertility specialist referral if cycles remain irregular after hormonal evaluation.        Return in about 6 months (around 11/30/2025).        Ronel Fuchs MD, 5/30/2025, 9:23 AM             [1]   Current Outpatient Medications   Medication Sig Dispense Refill    TIROSINT 37.5 MCG Oral Cap       levothyroxine 50 MCG Oral Tab Take 1 tablet (50 mcg total) by mouth before breakfast. 90 tablet 1    prenatal vitamin with DHA 27-0.8-228 MG Oral Cap Take 1 capsule by mouth daily.      medroxyPROGESTERone Acetate (PROVERA) 10 MG Oral Tab Take 1 tablet (10 mg total) by mouth daily. 10 tablet 5

## 2025-06-01 ENCOUNTER — PATIENT MESSAGE (OUTPATIENT)
Dept: ENDOCRINOLOGY CLINIC | Facility: CLINIC | Age: 25
End: 2025-06-01

## 2025-06-04 LAB
SEX HORM BIND GLOB: 22.5 NMOL/L
TESTOST % FREE+WEAK BND: 26.9 %
TESTOST FREE+WEAK BND: 11.6 NG/DL
TESTOSTERONE TOT /MS: 43.2 NG/DL

## 2025-08-24 ENCOUNTER — PATIENT MESSAGE (OUTPATIENT)
Dept: OBGYN CLINIC | Facility: CLINIC | Age: 25
End: 2025-08-24